# Patient Record
Sex: FEMALE | Race: WHITE | NOT HISPANIC OR LATINO | URBAN - METROPOLITAN AREA
[De-identification: names, ages, dates, MRNs, and addresses within clinical notes are randomized per-mention and may not be internally consistent; named-entity substitution may affect disease eponyms.]

---

## 2019-06-25 ENCOUNTER — OFFICE VISIT - HEALTHEAST (OUTPATIENT)
Dept: FAMILY MEDICINE | Facility: CLINIC | Age: 49
End: 2019-06-25

## 2019-06-25 DIAGNOSIS — R10.9 FLANK PAIN: ICD-10-CM

## 2019-06-25 DIAGNOSIS — S39.012A BACK STRAIN, INITIAL ENCOUNTER: ICD-10-CM

## 2019-06-25 LAB
ALBUMIN UR-MCNC: NEGATIVE MG/DL
APPEARANCE UR: CLEAR
BACTERIA #/AREA URNS HPF: ABNORMAL HPF
BACTERIA #/AREA URNS HPF: NORMAL HPF
BILIRUB UR QL STRIP: NEGATIVE
COLOR UR AUTO: YELLOW
GLUCOSE UR STRIP-MCNC: NEGATIVE MG/DL
HGB UR QL STRIP: NEGATIVE
KETONES UR STRIP-MCNC: NEGATIVE MG/DL
LEUKOCYTE ESTERASE UR QL STRIP: ABNORMAL
NITRATE UR QL: NEGATIVE
PH UR STRIP: 5.5 [PH] (ref 5–8)
RBC #/AREA URNS AUTO: ABNORMAL HPF
RBC #/AREA URNS AUTO: NORMAL HPF
SP GR UR STRIP: 1.02 (ref 1–1.03)
SQUAMOUS #/AREA URNS AUTO: >100 LPF
SQUAMOUS #/AREA URNS AUTO: NORMAL LPF
UROBILINOGEN UR STRIP-ACNC: ABNORMAL
WBC #/AREA URNS AUTO: ABNORMAL HPF
WBC #/AREA URNS AUTO: NORMAL HPF

## 2019-06-25 RX ORDER — IBUPROFEN 200 MG
200 TABLET ORAL EVERY 6 HOURS PRN
Status: SHIPPED | COMMUNITY
Start: 2019-06-25

## 2019-06-25 RX ORDER — METHOCARBAMOL 750 MG/1
750-1500 TABLET, FILM COATED ORAL EVERY 6 HOURS PRN
Qty: 40 TABLET | Refills: 0 | Status: SHIPPED | OUTPATIENT
Start: 2019-06-25

## 2021-06-03 VITALS — WEIGHT: 183 LBS

## 2021-06-17 NOTE — PATIENT INSTRUCTIONS - HE
Patient Instructions by Thania Martínez MD at 6/25/2019 12:50 PM     Author: Thania Martínez MD Service: -- Author Type: Physician    Filed: 6/25/2019  3:08 PM Encounter Date: 6/25/2019 Status: Addendum    : Thania Martínez MD (Physician)    Related Notes: Original Note by Thania Martínez MD (Physician) filed at 6/25/2019  2:07 PM       1. Avoid re-injury or any activity which aggravates the pain  2. May take tylenol every 6 hours as needed for pain  3. If follow up is needed, try and be seen at your primary clinic. Or you can be seen by any primary care provider at one of our other [facility] sites  4. Call clinic number with any questions - answered 24/7    Patient Education     Back Sprain or Strain  Injury to the muscles (strain) or ligaments (sprain) around the spine can be troubling. Injury may occur after a sudden forceful twisting or bending force such as in a car accident, after a simple awkward movement, or after lifting something heavy with poor body positioning. In any case, muscle spasm is often present and adds to the pain.  Thankfully, most people feel better in 1 to 2 weeks, and most of the rest in 1 to 2 months. Most people can remain active. Unless you had a forceful or traumatic physical injury such as a car accident or fall, X-rays may not be ordered for the first evaluation of a back sprain or strain. If pain continues and does not respond to medical treatment, your healthcare provider may then order X-rays and other tests.  Home care  The following guidelines will help you care for your injury at home:    When in bed, try to find a comfortable position. A firm mattress is best. Try lying flat on your back with pillows under your knees. You can also try lying on your side with your knees bent up toward your chest and a pillow between your knees.    Don't sit for long periods. Try not to take long car rides or take other trips that have you sitting for a long time. This puts more  stress on the lower back than standing or walking.    During the first 24 to 72 hours after an injury or flare-up, apply an ice pack to the painful area for 20 minutes. Then remove it for 20 minutes. Do this for 60 to 90 minutes, or several times a day. This will reduce swelling and pain. Be sure to wrap the ice pack in a thin towel or plastic to protect your skin.    You can start with ice, then switch to heat. Heat from a hot shower, hot bath, or heating pad reduces pain and works well for muscle spasms. Put heat on the painful area for 20 minutes, then remove for 20 minutes. Do this for 60 to 90 minutes, or several times a day. Do not use a heating pad while sleeping. It can burn the skin.    You can alternate the ice and heat. Talk with your healthcare provider to find out the best treatment or therapy for your back pain.    Therapeutic massage will help relax the back muscles without stretching them.    Be aware of safe lifting methods. Do not lift anything over 15 pounds until all of the pain is gone.  Medicines  Talk to your healthcare provider before using medicines, especially if you have other health problems or are taking other medicines.    You may use acetaminophen or ibuprofen to control pain, unless another pain medicine was prescribed. If you have chronic conditions like diabetes, liver or kidney disease, stomach ulcers, or gastrointestinal bleeding, or are taking blood-thinner medicines, talk with your doctor before taking any medicines.    Be careful if you are given prescription medicines, narcotics, or medicine for muscle spasm. They can cause drowsiness, and affect your coordination, reflexes, and judgment. Do not drive or operate heavy machinery when taking these types of medicines. Only take pain medicine as prescribed by your healthcare provider.  Follow-up care  Follow up with your healthcare provider, or as advised. You may need physical therapy or more tests if your symptoms get worse.  If  you had X-rays your healthcare provider may be checking for any broken bones, breaks, or fractures. Bruises and sprains can sometimes hurt as much as a fracture. These injuries can take time to heal completely. If your symptoms dont improve or they get worse, talk with your healthcare provider. You may need a repeat X-ray or other tests.  Call 911  Call 911 if any of the following occur:    Trouble breathing    Confused    Very drowsy or trouble awakening    Fainting or loss of consciousness    Rapid or very slow heart rate    Loss of bowel or bladder control  When to seek medical advice  Call your healthcare provider right away if any of the following occur:    Pain gets worse or spreads to your arms or legs    Weakness or numbness in one or both arms or legs    Numbness in the groin or genital area  Date Last Reviewed: 6/1/2016 2000-2017 [employer]. [address] [place] [id number]. All rights reserved. This information is not intended as a substitute for professional medical care. Always follow your healthcare professional's instructions.

## 2021-06-27 NOTE — PROGRESS NOTES
Progress Notes by Thania Martínez MD at 6/25/2019 12:50 PM     Author: Thania Martínez MD Service: -- Author Type: Physician    Filed: 6/25/2019  3:16 PM Encounter Date: 6/25/2019 Status: Signed    : Thania Martínez MD (Physician)         Subjective:   Christal Jaquez is a 48 y.o. female and is new to [facility].  Roomed by: MZ    Refills needed? No    Do you have any forms that need to be filled out? No      Chief Complaint   Patient presents with   ? Back Pain   Says her back lower back pain started 4 days ago. Says the pain is going up towards her mid back. Pain is sharp and constant. Says worse with bending over. Pain is eased a little with sitting and leaning forward. Denies recent urinary frequency, urgency, voiding in small amounts, dysuria, fever or chills, but admits flank pain. Usually gets up 2-3 times a night to urinate and that is normal for her.    Denies any previous back injury or MVA. Says she has had back pain from her kidneys before, and she was hospitalized for a week 6 months ago for severe dehydration. Says she does not drink much water, but she drinks ice tea, flavored water and pop. Admits being able to eat, drink and urinate normally. Denies any recent nausea, vomiting, belly pain, or diarrhea. Denies CP or shortness of breath. Says feeling more fatigued in the last couple of days.     PMH - none  PSH - Jan 2019 - [facility] for menses  FX - HTN - none, DM - none, CAD - none, Cancer - none  SX - Single, current every day cigarette smoker; currently visiting here from [place] and leaving in a couple of weeks.     Review of Systems  See HPI for ROS, otherwise balance of other systems negative  No Known Allergies    Current Outpatient Medications:   ?  ibuprofen (ADVIL,MOTRIN) 200 MG tablet, Take 200 mg by mouth every 6 (six) hours as needed for pain., Disp: , Rfl:     Objective:     Vitals:    06/25/19 1253   BP: 107/70   Patient Site: Right Arm   Patient Position: Sitting   Cuff  Size: Adult Regular   Pulse: (!) 102   Resp: 18   Temp: 98.3  F (36.8  C)   TempSrc: Oral   SpO2: 96%   Weight: 183 lb (83 kg)   Gen - Pt in NAD  Cor - RRR w/o murmur  Lungs - Good air entry, no wheezes or crackles noted  Gen -Patient in no apparent distress  MS - Positive CVA tenderness bilaterally  Spine - Straight,  Positive TTP in thoracic and lumbar area   Para-spinus muscles - Positive TTP in thoracic and lumbar areas bilaterally  Flexion - about 60 degrees  Extension - 0 degrees  Lateral bending - 25 degrees  Lateral rotation - slightly limited  DTRs - knees - 2+/4+, ankles - 2+/4+    Results for orders placed or performed in visit on 06/25/19   Urinalysis-UC if Indicated   Result Value Ref Range    Color, UA Yellow Colorless, Yellow, Straw, Light Yellow    Clarity, UA Clear Clear    Glucose, UA Negative Negative    Bilirubin, UA Negative Negative    Ketones, UA Negative Negative    Specific Gravity, UA 1.025 1.005 - 1.030    Blood, UA Negative Negative    pH, UA 5.5 5.0 - 8.0    Protein, UA Negative Negative mg/dL    Urobilinogen, UA 0.2 E.U./dL 0.2 E.U./dL, 1.0 E.U./dL    Nitrite, UA Negative Negative    Leukocytes, UA Trace (!) Negative    Bacteria, UA Moderate (!) None Seen hpf    RBC, UA 0-2 None Seen, 0-2 hpf    WBC, UA 5-10 (!) None Seen, 0-5 hpf    Squam Epithel, UA >100 (!) None Seen, 0-5 lpf   Urine,Microscopic   Result Value Ref Range    Bacteria, UA None Seen None Seen hpf    RBC, UA 0-2 None Seen, 0-2 hpf    WBC, UA 0-5 None Seen, 0-5 hpf    Squam Epithel, UA 0-5 None Seen, 0-5 lpf   Lab result discussed on day of visit.      Assessment - Plan   Medical Decision Making -38-year-old woman [facility] is visiting here from [place] presents with 4 days of mid and lower back pain.  She is worried about having a bladder infection but denies recent urinary frequency, urgency, voiding small amounts, fevers or chills.  She does admit to flank pain on both sides.  She denies any recent injury.   Denies any recent chest pain or shortness of breath.  On exam patient is afebrile and vital signs are reassuring.  She did have some decreased flexion and extension of her spine.  It was very tender over the thoracic and lumbar area.  Had CVA tenderness on both sides.  Her initial UA looked abnormal but it was not a clean-catch.  Patient did a second specimen the micro analysis was entirely within normal limits.  Discussed with patient that this is more likely a musculoskeletal problem and very unlikely to be a urinary problem.  Prescribed methocarbamol and advised patient to take Tylenol as needed.  See patient instructions.    1. Back strain, initial encounter  - methocarbamol (ROBAXIN) 750 MG tablet; Take 1-2 tablets (750-1,500 mg total) by mouth every 6 (six) hours as needed (pain or spasm).  Dispense: 40 tablet; Refill: 0    2. Flank pain    - Urinalysis-UC if Indicated  - Urine,Microscopic    At the conclusion of the encounter, assessment and plan were discussed. All questions were answered. The patient or guardian acknowledged understanding and was involved in the decision making regarding the overall care plan.    Patient Instructions   1. Avoid re-injury or any activity which aggravates the pain  2. May take tylenol every 6 hours as needed for pain  3. If follow up is needed, try and be seen at your primary clinic. Or you can be seen by any primary care provider at one of our other [facility] sites  4. Call clinic number with any questions - answered 24/7    Patient Education     Back Sprain or Strain  Injury to the muscles (strain) or ligaments (sprain) around the spine can be troubling. Injury may occur after a sudden forceful twisting or bending force such as in a car accident, after a simple awkward movement, or after lifting something heavy with poor body positioning. In any case, muscle spasm is often present and adds to the pain.  Thankfully, most people feel better in 1 to 2 weeks, and most of the  rest in 1 to 2 months. Most people can remain active. Unless you had a forceful or traumatic physical injury such as a car accident or fall, X-rays may not be ordered for the first evaluation of a back sprain or strain. If pain continues and does not respond to medical treatment, your healthcare provider may then order X-rays and other tests.  Home care  The following guidelines will help you care for your injury at home:    When in bed, try to find a comfortable position. A firm mattress is best. Try lying flat on your back with pillows under your knees. You can also try lying on your side with your knees bent up toward your chest and a pillow between your knees.    Don't sit for long periods. Try not to take long car rides or take other trips that have you sitting for a long time. This puts more stress on the lower back than standing or walking.    During the first 24 to 72 hours after an injury or flare-up, apply an ice pack to the painful area for 20 minutes. Then remove it for 20 minutes. Do this for 60 to 90 minutes, or several times a day. This will reduce swelling and pain. Be sure to wrap the ice pack in a thin towel or plastic to protect your skin.    You can start with ice, then switch to heat. Heat from a hot shower, hot bath, or heating pad reduces pain and works well for muscle spasms. Put heat on the painful area for 20 minutes, then remove for 20 minutes. Do this for 60 to 90 minutes, or several times a day. Do not use a heating pad while sleeping. It can burn the skin.    You can alternate the ice and heat. Talk with your healthcare provider to find out the best treatment or therapy for your back pain.    Therapeutic massage will help relax the back muscles without stretching them.    Be aware of safe lifting methods. Do not lift anything over 15 pounds until all of the pain is gone.  Medicines  Talk to your healthcare provider before using medicines, especially if you have other health problems or  are taking other medicines.    You may use acetaminophen or ibuprofen to control pain, unless another pain medicine was prescribed. If you have chronic conditions like diabetes, liver or kidney disease, stomach ulcers, or gastrointestinal bleeding, or are taking blood-thinner medicines, talk with your doctor before taking any medicines.    Be careful if you are given prescription medicines, narcotics, or medicine for muscle spasm. They can cause drowsiness, and affect your coordination, reflexes, and judgment. Do not drive or operate heavy machinery when taking these types of medicines. Only take pain medicine as prescribed by your healthcare provider.  Follow-up care  Follow up with your healthcare provider, or as advised. You may need physical therapy or more tests if your symptoms get worse.  If you had X-rays your healthcare provider may be checking for any broken bones, breaks, or fractures. Bruises and sprains can sometimes hurt as much as a fracture. These injuries can take time to heal completely. If your symptoms dont improve or they get worse, talk with your healthcare provider. You may need a repeat X-ray or other tests.  Call 911  Call 911 if any of the following occur:    Trouble breathing    Confused    Very drowsy or trouble awakening    Fainting or loss of consciousness    Rapid or very slow heart rate    Loss of bowel or bladder control  When to seek medical advice  Call your healthcare provider right away if any of the following occur:    Pain gets worse or spreads to your arms or legs    Weakness or numbness in one or both arms or legs    Numbness in the groin or genital area  Date Last Reviewed: 6/1/2016 2000-2017 [employer]. [address] [place] [address], [place] [id number]. All rights reserved. This information is not intended as a substitute for professional medical care. Always follow your healthcare professional's instructions.